# Patient Record
Sex: FEMALE | Race: BLACK OR AFRICAN AMERICAN | NOT HISPANIC OR LATINO
[De-identification: names, ages, dates, MRNs, and addresses within clinical notes are randomized per-mention and may not be internally consistent; named-entity substitution may affect disease eponyms.]

---

## 2017-01-13 ENCOUNTER — APPOINTMENT (OUTPATIENT)
Dept: VASCULAR SURGERY | Facility: CLINIC | Age: 57
End: 2017-01-13

## 2017-03-24 ENCOUNTER — APPOINTMENT (OUTPATIENT)
Dept: VASCULAR SURGERY | Facility: CLINIC | Age: 57
End: 2017-03-24

## 2017-12-18 ENCOUNTER — APPOINTMENT (OUTPATIENT)
Dept: VASCULAR SURGERY | Facility: CLINIC | Age: 57
End: 2017-12-18
Payer: COMMERCIAL

## 2017-12-18 PROCEDURE — 93970 EXTREMITY STUDY: CPT

## 2017-12-18 PROCEDURE — 99213 OFFICE O/P EST LOW 20 MIN: CPT | Mod: 25

## 2018-03-12 ENCOUNTER — APPOINTMENT (OUTPATIENT)
Dept: VASCULAR SURGERY | Facility: CLINIC | Age: 58
End: 2018-03-12

## 2018-06-15 ENCOUNTER — APPOINTMENT (OUTPATIENT)
Dept: VASCULAR SURGERY | Facility: CLINIC | Age: 58
End: 2018-06-15

## 2018-08-31 ENCOUNTER — APPOINTMENT (OUTPATIENT)
Dept: VASCULAR SURGERY | Facility: CLINIC | Age: 58
End: 2018-08-31
Payer: MEDICAID

## 2018-08-31 PROCEDURE — 93971 EXTREMITY STUDY: CPT

## 2018-08-31 PROCEDURE — 99212 OFFICE O/P EST SF 10 MIN: CPT

## 2019-01-18 ENCOUNTER — APPOINTMENT (OUTPATIENT)
Dept: VASCULAR SURGERY | Facility: CLINIC | Age: 59
End: 2019-01-18
Payer: MEDICAID

## 2019-01-18 PROCEDURE — 99213 OFFICE O/P EST LOW 20 MIN: CPT

## 2019-01-18 PROCEDURE — 93971 EXTREMITY STUDY: CPT

## 2019-01-18 NOTE — ASSESSMENT
[FreeTextEntry1] : no vascular issues\par she needs to get with the ortho doc to clean the knee [Arterial/Venous Disease] : arterial/venous disease

## 2019-01-18 NOTE — HISTORY OF PRESENT ILLNESS
[FreeTextEntry1] : pt comes in with pain in the left knee\par she needs to have knee surgery \par her knee is full of fluid and the fluid is seeping down to the anterior leg and causing the swelling

## 2019-01-18 NOTE — PHYSICAL EXAM
[2+] : left 2+ [Ankle Swelling (On Exam)] : present [Ankle Swelling Bilaterally] : bilaterally  [Ankle Swelling On The Left] : moderate [No Rash or Lesion] : No rash or lesion [Calm] : calm [de-identified] : pleasant [de-identified] : pain on anterior left leg right below the knee and swelling there

## 2019-01-18 NOTE — REVIEW OF SYSTEMS
[Arthralgias] : arthralgias [Joint Pain] : joint pain [Joint Swelling] : joint swelling [Joint Stiffness] : joint stiffness [Limb Pain] : limb pain [Limb Swelling] : limb swelling [Negative] : Heme/Lymph

## 2019-11-08 ENCOUNTER — APPOINTMENT (OUTPATIENT)
Dept: VASCULAR SURGERY | Facility: CLINIC | Age: 59
End: 2019-11-08

## 2019-11-15 ENCOUNTER — APPOINTMENT (OUTPATIENT)
Dept: VASCULAR SURGERY | Facility: CLINIC | Age: 59
End: 2019-11-15
Payer: MEDICARE

## 2019-11-15 DIAGNOSIS — Z91.041 RADIOGRAPHIC DYE ALLERGY STATUS: ICD-10-CM

## 2019-11-15 DIAGNOSIS — I87.1 COMPRESSION OF VEIN: ICD-10-CM

## 2019-11-15 PROCEDURE — 99214 OFFICE O/P EST MOD 30 MIN: CPT

## 2019-11-15 PROCEDURE — 93971 EXTREMITY STUDY: CPT

## 2019-11-15 RX ORDER — PREDNISONE 50 MG/1
50 TABLET ORAL
Qty: 3 | Refills: 0 | Status: ACTIVE | COMMUNITY
Start: 2019-11-15 | End: 1900-01-01

## 2019-11-15 RX ORDER — CAMPHOR 0.45 %
25 GEL (GRAM) TOPICAL
Qty: 2 | Refills: 0 | Status: ACTIVE | COMMUNITY
Start: 2019-11-15 | End: 1900-01-01

## 2019-11-18 NOTE — ASSESSMENT
[Foot care/Footwear] : foot care/footwear [FreeTextEntry1] : 58 yo F comes for follow up visit. Patient s/p R knee surgery few years ago returns today since her left leg is swollen more that usual. \par Mild left leg edema. Negative venous doppler for DVT.\par No vascular issues at this time, pain likely musculoskeletal.\par Compression stockings were recommended.\par F/u prn. \par

## 2019-11-18 NOTE — PHYSICAL EXAM
[Respiratory Effort] : normal respiratory effort [Normal Heart Sounds] : normal heart sounds [2+] : left 2+ [Ankle Swelling (On Exam)] : present [Ankle Swelling On The Left] : of the left ankle [Ankle Swelling On The Right] : mild [No Rash or Lesion] : No rash or lesion [Alert] : alert [Oriented to Person] : oriented to person [Oriented to Place] : oriented to place [Oriented to Time] : oriented to time [Calm] : calm [JVD] : no jugular venous distention  [de-identified] : obese habitus, NAD  [] : not present [Varicose Veins Of Lower Extremities] : not present [de-identified] : R knee: well healed scar

## 2019-11-18 NOTE — HISTORY OF PRESENT ILLNESS
[FreeTextEntry1] : 60 yo F comes for follow up visit. Patient s/p R knee surgery few years ago returns today since her left leg is swollen more that usual. She denies pain, skin breakdown, no history of DVT. She ambulates without difficulties, denies claudication.  Today she presents with increased left leg swelling and some discomfort in her left calf. \par \par

## 2019-11-21 ENCOUNTER — FORM ENCOUNTER (OUTPATIENT)
Age: 59
End: 2019-11-21

## 2019-11-22 ENCOUNTER — APPOINTMENT (OUTPATIENT)
Dept: CT IMAGING | Facility: HOSPITAL | Age: 59
End: 2019-11-22
Payer: MEDICARE

## 2019-11-22 ENCOUNTER — OUTPATIENT (OUTPATIENT)
Dept: OUTPATIENT SERVICES | Facility: HOSPITAL | Age: 59
LOS: 1 days | End: 2019-11-22
Payer: MEDICARE

## 2019-11-22 PROCEDURE — 74177 CT ABD & PELVIS W/CONTRAST: CPT | Mod: 26

## 2019-11-22 PROCEDURE — 74177 CT ABD & PELVIS W/CONTRAST: CPT

## 2020-08-07 ENCOUNTER — APPOINTMENT (OUTPATIENT)
Dept: VASCULAR SURGERY | Facility: CLINIC | Age: 60
End: 2020-08-07

## 2020-12-21 ENCOUNTER — APPOINTMENT (OUTPATIENT)
Dept: VASCULAR SURGERY | Facility: CLINIC | Age: 60
End: 2020-12-21

## 2021-10-08 ENCOUNTER — APPOINTMENT (OUTPATIENT)
Dept: VASCULAR SURGERY | Facility: CLINIC | Age: 61
End: 2021-10-08
Payer: COMMERCIAL

## 2021-10-08 PROCEDURE — 99213 OFFICE O/P EST LOW 20 MIN: CPT

## 2021-10-14 NOTE — PHYSICAL EXAM
[Respiratory Effort] : normal respiratory effort [Ankle Swelling (On Exam)] : present [No Rash or Lesion] : No rash or lesion [Alert] : alert [Oriented to Person] : oriented to person [Oriented to Place] : oriented to place [Oriented to Time] : oriented to time [Calm] : calm [2+] : left 2+ [Ankle Swelling Bilaterally] : bilaterally  [Ankle Swelling On The Left] : moderate [JVD] : no jugular venous distention  [Varicose Veins Of Lower Extremities] : not present [] : not present [de-identified] : obese habitus, NAD  [de-identified] : NC/AT  [de-identified] : Supple  [de-identified] : FROM 5/5 x 4.  [de-identified] : R knee: well healed scar

## 2021-10-14 NOTE — HISTORY OF PRESENT ILLNESS
[FreeTextEntry1] : 62 yo F w/h/o s/p knee surgery, bilateral LE edema, returns for follow up visit. She was last seen here in 2019.  She had CTV of pelvis two years ago and was found to have markedly enlarged multi fibroid uterus compressing iliac veins. She was recommended at that time to f/u with her GYN. No intervention was done since then, however she recently had a US of her pelvis that showed large fibroids. She also c/o sharp L back pain that radiates to her posterior L thigh. She ambulates without difficulties, denies claudication. Additionally, she is wondering if she can go ahead with her pending L knee surgery.

## 2021-10-14 NOTE — ASSESSMENT
[FreeTextEntry1] : 62 yo F w/h/o s/p knee surgery, bilateral LE edema, returns for follow up visit. Previously was found to have bilateral iliac vein compression by large fibroid uterus and now with lower back pain radiating to her posterior thigh. BLE edema remains the same. I explained to the pt that lower back/thighsymptoms are not vascular and are likely secondary to spine problems and should f/u with a spine specialist. No vascular surgery intervention required at this moment. She will continue to follow up with us here PRN. She is cleared for her L knee surgery. \par

## 2021-10-14 NOTE — REVIEW OF SYSTEMS
[Lower Ext Edema] : lower extremity edema [As Noted in HPI] : as noted in HPI [Negative] : Heme/Lymph [Limb Pain] : limb pain [Limb Swelling] : limb swelling [Leg Claudication] : no intermittent leg claudication

## 2022-04-07 ENCOUNTER — APPOINTMENT (OUTPATIENT)
Dept: VASCULAR SURGERY | Facility: CLINIC | Age: 62
End: 2022-04-07

## 2022-07-14 ENCOUNTER — APPOINTMENT (OUTPATIENT)
Dept: VASCULAR SURGERY | Facility: CLINIC | Age: 62
End: 2022-07-14

## 2022-07-25 ENCOUNTER — APPOINTMENT (OUTPATIENT)
Dept: VASCULAR SURGERY | Facility: CLINIC | Age: 62
End: 2022-07-25

## 2022-07-25 VITALS
HEART RATE: 81 BPM | WEIGHT: 208 LBS | DIASTOLIC BLOOD PRESSURE: 91 MMHG | HEIGHT: 65 IN | SYSTOLIC BLOOD PRESSURE: 167 MMHG | BODY MASS INDEX: 34.66 KG/M2

## 2022-07-25 PROCEDURE — 99212 OFFICE O/P EST SF 10 MIN: CPT

## 2022-07-25 PROCEDURE — 93970 EXTREMITY STUDY: CPT

## 2022-07-28 ENCOUNTER — APPOINTMENT (OUTPATIENT)
Dept: VASCULAR SURGERY | Facility: CLINIC | Age: 62
End: 2022-07-28

## 2022-07-28 NOTE — PHYSICAL EXAM
[Respiratory Effort] : normal respiratory effort [2+] : left 2+ [Ankle Swelling (On Exam)] : present [Ankle Swelling Bilaterally] : bilaterally  [Ankle Swelling On The Left] : moderate [No Rash or Lesion] : No rash or lesion [Alert] : alert [Oriented to Person] : oriented to person [Oriented to Place] : oriented to place [Oriented to Time] : oriented to time [Calm] : calm [JVD] : no jugular venous distention  [Varicose Veins Of Lower Extremities] : not present [] : not present [de-identified] : obese habitus, NAD  [de-identified] : NC/AT  [de-identified] : Supple  [de-identified] : FROM 5/5 x 4.  [de-identified] : R knee: well healed scar

## 2022-07-28 NOTE — HISTORY OF PRESENT ILLNESS
[FreeTextEntry1] : 60 yo F w/h/o s/p knee surgery, bilateral LE edema, returns for follow up visit. She was last seen here in 2021.  \par Previously was found to have bilateral iliac vein compression by large fibroid uterus with lower back pain radiating to her posterior thigh. We explained to the pt that lower back/thigh symptoms are not vascular and are likely secondary to spine problems and should f/u with a spine specialist. No vascular surgery intervention was needed. She was cleared for her L knee surgery. \par \par Patient returns today for bilateral leg pain and cramping after sitting for long periods of time. Additionally, she c/o occasional cramps over lower abdomen. She ambulates without difficulties, denies claudication.

## 2022-07-28 NOTE — ADDENDUM
[FreeTextEntry1] : I, Dr. Almas Soler, personally performed the evaluation and management (E/M) services for this established patient who presents today with (a) new problem(s)/exacerbation of (an) existing condition(s).  That E/M includes conducting the examination, assessing all new/exacerbated conditions, and establishing a new plan of care.  Today, my ACP, Cadence BLANTON, was here to observe my evaluation and management services for this new problem/exacerbated condition to be followed going forward.\par \par \par \par \par \par \par The documentation for this encounter was entered by Trish Car acting as scribe for Dr. Almas Soler.\par

## 2022-07-28 NOTE — ASSESSMENT
[Foot care/Footwear] : foot care/footwear [FreeTextEntry1] : 62 yo F w/h/o s/p knee surgery, bilateral LE edema and leg cramps, returns for follow up visit. \par On exam, mild bilateral LE edema, legs well perfused, palpable peripheral pulses. \par LE Venous US done today reveals negative DVT bilaterally. \par Patient was explained that her symptoms not vascular. No intervention necessary. We recommended patient to discuss symptoms with PCP and GI for further evaluation. Patient can take magnesium supplements for leg cramps and wear compression stockings to control LE edema. \par F/u as needed.

## 2022-07-28 NOTE — PHYSICAL EXAM
[Respiratory Effort] : normal respiratory effort [2+] : left 2+ [Ankle Swelling (On Exam)] : present [Ankle Swelling Bilaterally] : bilaterally  [Ankle Swelling On The Left] : moderate [No Rash or Lesion] : No rash or lesion [Alert] : alert [Oriented to Person] : oriented to person [Oriented to Place] : oriented to place [Oriented to Time] : oriented to time [Calm] : calm [JVD] : no jugular venous distention  [Varicose Veins Of Lower Extremities] : not present [] : not present [de-identified] : obese habitus, NAD  [de-identified] : NC/AT  [de-identified] : Supple  [de-identified] : FROM 5/5 x 4.  [de-identified] : R knee: well healed scar
